# Patient Record
Sex: FEMALE | Race: WHITE
[De-identification: names, ages, dates, MRNs, and addresses within clinical notes are randomized per-mention and may not be internally consistent; named-entity substitution may affect disease eponyms.]

---

## 2020-11-02 ENCOUNTER — HOSPITAL ENCOUNTER (EMERGENCY)
Dept: HOSPITAL 96 - M.ERS | Age: 80
Discharge: HOME | End: 2020-11-02
Payer: MEDICARE

## 2020-11-02 VITALS — HEIGHT: 68 IN | WEIGHT: 6.19 LBS | BODY MASS INDEX: 0.94 KG/M2

## 2020-11-02 VITALS — SYSTOLIC BLOOD PRESSURE: 152 MMHG | DIASTOLIC BLOOD PRESSURE: 74 MMHG

## 2020-11-02 DIAGNOSIS — R55: Primary | ICD-10-CM

## 2020-11-02 DIAGNOSIS — Z88.8: ICD-10-CM

## 2020-11-02 DIAGNOSIS — Z88.2: ICD-10-CM

## 2020-11-02 DIAGNOSIS — E11.9: ICD-10-CM

## 2020-11-02 DIAGNOSIS — Z90.89: ICD-10-CM

## 2020-11-02 DIAGNOSIS — I10: ICD-10-CM

## 2020-11-02 DIAGNOSIS — E03.9: ICD-10-CM

## 2020-11-02 DIAGNOSIS — Z90.49: ICD-10-CM

## 2020-11-02 DIAGNOSIS — Z90.710: ICD-10-CM

## 2020-11-02 LAB
ABSOLUTE BASOPHILS: 0 THOU/UL (ref 0–0.2)
ABSOLUTE EOSINOPHILS: 0.1 THOU/UL (ref 0–0.7)
ABSOLUTE MONOCYTES: 0.5 THOU/UL (ref 0–1.2)
ALBUMIN SERPL-MCNC: 3.2 G/DL (ref 3.4–5)
ALP SERPL-CCNC: 71 U/L (ref 46–116)
ALT SERPL-CCNC: 14 U/L (ref 30–65)
ANION GAP SERPL CALC-SCNC: 5 MMOL/L (ref 7–16)
AST SERPL-CCNC: 12 U/L (ref 15–37)
BACTERIA-REFLEX: (no result) /HPF
BASOPHILS NFR BLD AUTO: 0.7 %
BILIRUB SERPL-MCNC: 0.3 MG/DL
BILIRUB UR-MCNC: NEGATIVE MG/DL
BUN SERPL-MCNC: 16 MG/DL (ref 7–18)
CALCIUM SERPL-MCNC: 9.4 MG/DL (ref 8.5–10.1)
CHLORIDE SERPL-SCNC: 99 MMOL/L (ref 98–107)
CO2 SERPL-SCNC: 29 MMOL/L (ref 21–32)
COLOR UR: YELLOW
CREAT SERPL-MCNC: 1.4 MG/DL (ref 0.6–1.3)
EOSINOPHIL NFR BLD: 2.6 %
GLUCOSE SERPL-MCNC: 143 MG/DL (ref 70–99)
GRANULOCYTES NFR BLD MANUAL: 55.7 %
HCT VFR BLD CALC: 34.2 % (ref 37–47)
HGB BLD-MCNC: 11.6 GM/DL (ref 12–15)
KETONES UR STRIP-MCNC: (no result) MG/DL
LYMPHOCYTES # BLD: 1.8 THOU/UL (ref 0.8–5.3)
LYMPHOCYTES NFR BLD AUTO: 32 %
MCH RBC QN AUTO: 31.3 PG (ref 26–34)
MCHC RBC AUTO-ENTMCNC: 33.8 G/DL (ref 28–37)
MCV RBC: 92.6 FL (ref 80–100)
MONOCYTES NFR BLD: 9 %
MPV: 7.7 FL. (ref 7.2–11.1)
NEUTROPHILS # BLD: 3.1 THOU/UL (ref 1.6–8.1)
NUCLEATED RBCS: 0 /100WBC
PLATELET COUNT*: 179 THOU/UL (ref 150–400)
POTASSIUM SERPL-SCNC: 3.7 MMOL/L (ref 3.5–5.1)
PROT SERPL-MCNC: 7.4 G/DL (ref 6.4–8.2)
PROT UR QL STRIP: NEGATIVE
RBC # BLD AUTO: 3.69 MIL/UL (ref 4.2–5)
RBC # UR STRIP: (no result) /UL
RBC #/AREA URNS HPF: (no result) /HPF (ref 0–2)
RDW-CV: 14.5 % (ref 10.5–14.5)
SODIUM SERPL-SCNC: 133 MMOL/L (ref 136–145)
SP GR UR STRIP: 1.02 (ref 1–1.03)
SQUAMOUS: (no result) /LPF (ref 0–3)
URINE CLARITY: CLEAR
URINE GLUCOSE-RANDOM: NEGATIVE
URINE LEUKOCYTES-REFLEX: (no result)
URINE NITRITE-REFLEX: NEGATIVE
URINE WBC-REFLEX: (no result) /HPF (ref 0–5)
UROBILINOGEN UR STRIP-ACNC: 0.2 E.U./DL (ref 0.2–1)
WBC # BLD AUTO: 5.5 THOU/UL (ref 4–11)

## 2020-11-03 NOTE — EKG
[place] [address]
Phone:  [phone]                     ELECTROCARDIOGRAM REPORT      
_______________________________________________________________________________
 
Name:         KOBE NICHOLE             Room:                     [facility]#:    [id number]     Account #:     [id number]  
Admission:    20    Attend Phys:                     
Discharge:    20    Date of Birth: [birth year]  
Date of Service: 20  Report #:      [id number]
        [id number]
_______________________________________________________________________________
THIS REPORT FOR:  //name//                      
 
                         [facility] ED
                                       
Test Date:    2020               Test Time:    18:02:51
Pat Name:     KOBE NICHOLE          Department:   
Patient ID:   [id number]            Room:          
Gender:                               Technician:   [facility]
:          1940               Requested By: Selina Beatty
Order Number: [id number] MD:   Jeremiah Mckeon
                                 Measurements
Intervals                              Axis          
Rate:         156                      P:            0
OK:           63                       QRS:          77
QRSD:         185                      T:            143
QT:           339                                    
QTc:          546                                    
                           Interpretive Statements
Sinus rhythm
Anteroseptal infarct, old, possible
Artifact in lead(s) aVR,aVL,V1,V2,V3,V4,V5,V6 and baseline wander in lead(s)
V6
No previous ECG available for comparison
Recommend repeat EKG
Electronically Signed On 11-3-2020 9:35:30 CST by Jeremiah Mckeon
[email or web address]
 
 
 
 
 
 
 
 
 
 
 
 
 
 
 
 
 
 
  <ELECTRONICALLY SIGNED>
                                           By: Jeremiah Mckeon MD, FACC   
  20     0935
D: 20   _____________________________________
T: 20   Jeremiah Mckeon MD, FACC     /EPI